# Patient Record
Sex: MALE | Race: WHITE
[De-identification: names, ages, dates, MRNs, and addresses within clinical notes are randomized per-mention and may not be internally consistent; named-entity substitution may affect disease eponyms.]

---

## 2021-08-26 ENCOUNTER — HOSPITAL ENCOUNTER (EMERGENCY)
Dept: HOSPITAL 8 - ED | Age: 41
Discharge: HOME | End: 2021-08-26
Payer: COMMERCIAL

## 2021-08-26 VITALS — BODY MASS INDEX: 30.15 KG/M2 | WEIGHT: 260.59 LBS | HEIGHT: 78 IN

## 2021-08-26 VITALS — SYSTOLIC BLOOD PRESSURE: 112 MMHG | DIASTOLIC BLOOD PRESSURE: 75 MMHG

## 2021-08-26 DIAGNOSIS — I66.8: Primary | ICD-10-CM

## 2021-08-26 DIAGNOSIS — M54.5: ICD-10-CM

## 2021-08-26 DIAGNOSIS — R42: ICD-10-CM

## 2021-08-26 DIAGNOSIS — M54.2: ICD-10-CM

## 2021-08-26 DIAGNOSIS — E11.9: ICD-10-CM

## 2021-08-26 PROCEDURE — 70551 MRI BRAIN STEM W/O DYE: CPT

## 2021-08-26 PROCEDURE — 99285 EMERGENCY DEPT VISIT HI MDM: CPT

## 2021-08-26 PROCEDURE — 70544 MR ANGIOGRAPHY HEAD W/O DYE: CPT

## 2021-08-26 PROCEDURE — 82962 GLUCOSE BLOOD TEST: CPT

## 2021-08-26 NOTE — NUR
PT STATES AT [facility] HE RECIEVED CONTRAST WITH CTS DONE. DR CH 
AWARE, STATES CONTRAST IS DIFFERENT THEN WHAT WILL BE USED HERE FOR MRI.